# Patient Record
Sex: FEMALE | Race: WHITE | HISPANIC OR LATINO | Employment: STUDENT | URBAN - METROPOLITAN AREA
[De-identification: names, ages, dates, MRNs, and addresses within clinical notes are randomized per-mention and may not be internally consistent; named-entity substitution may affect disease eponyms.]

---
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
## 2019-07-30 ENCOUNTER — OFFICE VISIT (OUTPATIENT)
Dept: FAMILY MEDICINE CLINIC | Facility: CLINIC | Age: 19
End: 2019-07-30
Payer: COMMERCIAL

## 2019-07-30 VITALS
WEIGHT: 140 LBS | DIASTOLIC BLOOD PRESSURE: 78 MMHG | RESPIRATION RATE: 18 BRPM | HEIGHT: 59 IN | HEART RATE: 86 BPM | SYSTOLIC BLOOD PRESSURE: 106 MMHG | OXYGEN SATURATION: 99 % | BODY MASS INDEX: 28.22 KG/M2

## 2019-07-30 DIAGNOSIS — Z76.89 ENCOUNTER PRIOR TO INITIATION OF MEDICATION: Primary | ICD-10-CM

## 2019-07-30 PROCEDURE — 99213 OFFICE O/P EST LOW 20 MIN: CPT | Performed by: FAMILY MEDICINE

## 2019-07-30 PROCEDURE — 3008F BODY MASS INDEX DOCD: CPT | Performed by: FAMILY MEDICINE

## 2019-08-01 NOTE — PROGRESS NOTES
Assessment/Plan:    Diagnoses and all orders for this visit:    #1: Encounter prior to initiation of medication  Due to discontinuing medication and not following up, advised patient to seek help of Psychiatry for true diagnosis of Adult ADHD; Information for [employer], [address] , and [facility]; Patient voiced understanding and in agreement with plan        Subjective:      Patient ID: Vivian Barboza is a 20 y o  female  HPI    20year old female presents to clinic to discuss starting ADHD medication  Last seen in clinic 6 months ago  Started on Atamoxetine for ADHD  Trial period to help with concentration for 30 days  Patient did not follow up  States she did not like the medication  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  States teacher recommended Aderall  The following portions of the patient's history were reviewed and updated as appropriate: allergies, current medications, past family history, past medical history, past social history, past surgical history and problem list     Review of Systems   Psychiatric/Behavioral: Positive for decreased concentration  Negative for agitation, confusion, dysphoric mood, hallucinations, self-injury, sleep disturbance and suicidal ideas  The patient is nervous/anxious  The patient is not hyperactive  All other systems reviewed and are negative  Objective:    /78   Pulse 86   Resp 18   Ht 4' 11" (1 499 m)   Wt 63 5 kg (140 lb)   SpO2 99%   BMI 28 28 kg/m²      Physical Exam   Nursing note and vitals reviewed  **No Physical Exam Performed;  Discussion regarding starting ADHD Medication only**

## 2019-08-13 ENCOUNTER — HOSPITAL ENCOUNTER (EMERGENCY)
Facility: HOSPITAL | Age: 19
Discharge: HOME | End: 2019-08-13
Attending: EMERGENCY MEDICINE | Admitting: EMERGENCY MEDICINE
Payer: COMMERCIAL

## 2019-08-13 VITALS
SYSTOLIC BLOOD PRESSURE: 129 MMHG | HEART RATE: 86 BPM | DIASTOLIC BLOOD PRESSURE: 74 MMHG | OXYGEN SATURATION: 99 % | WEIGHT: 140 LBS | TEMPERATURE: 99.1 F | RESPIRATION RATE: 18 BRPM | BODY MASS INDEX: 28.28 KG/M2

## 2019-08-13 DIAGNOSIS — J02.9 PHARYNGITIS: Primary | ICD-10-CM

## 2019-08-13 PROCEDURE — 99282 EMERGENCY DEPT VISIT SF MDM: CPT

## 2019-08-13 RX ORDER — AMOXICILLIN 500 MG/1
500 CAPSULE ORAL 3 TIMES DAILY
Qty: 30 CAPSULE | Refills: 0 | Status: SHIPPED | OUTPATIENT
Start: 2019-08-13 | End: 2019-08-23

## 2019-08-13 RX ORDER — AMOXICILLIN 250 MG/1
500 CAPSULE ORAL ONCE
Status: COMPLETED | OUTPATIENT
Start: 2019-08-13 | End: 2019-08-13

## 2019-08-13 RX ADMIN — AMOXICILLIN 500 MG: 250 CAPSULE ORAL at 21:24

## 2019-08-14 NOTE — ED PROVIDER NOTES
History  Chief Complaint   Patient presents with    Sore Throat     Pt states sore throat and left ear pain for the last few days  21 yo female c/o right ear pain last week that she treated with peroxide drops and seemed to get better but the a couple days ago started with left ear and throat pain   + associated swelling and pain in left side of neck  No fever  No vomiting or diarrhea  No cough  Has not been swimming  History provided by:  Patient   used: No        Prior to Admission Medications   Prescriptions Last Dose Informant Patient Reported? Taking?   atomoxetine (STRATTERA) 40 mg capsule   No No   Sig: Take 1 capsule (40 mg total) by mouth daily for 3 doses Take tablet for first 3 days   atomoxetine (STRATTERA) 80 MG capsule   No Yes   Sig: Take 1 capsule (80 mg total) by mouth daily for 30 doses      Facility-Administered Medications: None       Past Medical History:   Diagnosis Date    ADHD        Past Surgical History:   Procedure Laterality Date    SINUS SURGERY         Family History   Problem Relation Age of Onset    No Known Problems Mother     No Known Problems Father      I have reviewed and agree with the history as documented  Social History     Tobacco Use    Smoking status: Never Smoker    Smokeless tobacco: Never Used   Substance Use Topics    Alcohol use: No    Drug use: No        Review of Systems   Constitutional: Negative  Negative for fever  HENT: Positive for ear pain and sore throat  Eyes: Negative  Respiratory: Negative  Negative for cough and shortness of breath  Cardiovascular: Negative  Negative for chest pain  Gastrointestinal: Negative  Negative for abdominal pain, diarrhea, nausea and vomiting  Genitourinary: Negative  Negative for dysuria and flank pain  Musculoskeletal: Negative  Negative for back pain and myalgias  Skin: Negative  Negative for rash and wound  Neurological: Negative    Negative for dizziness and headaches  Hematological: Does not bruise/bleed easily  Psychiatric/Behavioral: Negative  All other systems reviewed and are negative  Physical Exam  Physical Exam   Constitutional: She is oriented to person, place, and time  She appears well-developed and well-nourished  No distress  HENT:   Head: Normocephalic and atraumatic  Nose: Nose normal    Mouth/Throat: Uvula is midline  Posterior oropharyngeal edema and posterior oropharyngeal erythema present  No tonsillar abscesses  TMs not red but are dull, ? Fluid behind left TM,  No pain with pinna pull or tragus pressure   Eyes: Conjunctivae are normal    Neck: Normal range of motion  Neck supple  No tracheal deviation present  Cardiovascular: Normal rate, regular rhythm and normal heart sounds  No murmur heard  Pulmonary/Chest: Effort normal and breath sounds normal  No respiratory distress  Abdominal: Soft  Bowel sounds are normal  She exhibits no distension  There is no tenderness  Musculoskeletal: Normal range of motion  She exhibits no edema or deformity  Lymphadenopathy:     She has cervical adenopathy  Neurological: She is alert and oriented to person, place, and time  No cranial nerve deficit  She exhibits normal muscle tone  Skin: Skin is warm and dry  No rash noted  She is not diaphoretic  No pallor  Psychiatric: She has a normal mood and affect  Her behavior is normal    Nursing note and vitals reviewed        Vital Signs  ED Triage Vitals [08/13/19 2053]   Temperature Pulse Respirations Blood Pressure SpO2   99 1 °F (37 3 °C) 86 18 129/74 99 %      Temp Source Heart Rate Source Patient Position - Orthostatic VS BP Location FiO2 (%)   Tympanic Monitor Sitting Right arm --      Pain Score       8           Vitals:    08/13/19 2053   BP: 129/74   Pulse: 86   Patient Position - Orthostatic VS: Sitting         Visual Acuity      ED Medications  Medications   amoxicillin (AMOXIL) capsule 500 mg (has no administration in time range)       Diagnostic Studies  Results Reviewed     None                 No orders to display              Procedures  Procedures       ED Course                               MDM  Number of Diagnoses or Management Options  Diagnosis management comments: Advised abx, symptomatic tx with throat lozenges and/or salt water gargles, tylenol/advil prn  Disposition  Final diagnoses:   Pharyngitis     Time reflects when diagnosis was documented in both MDM as applicable and the Disposition within this note     Time User Action Codes Description Comment    4/09/1274  2:46 PM Caitie Needle Add [K93 7] Pharyngitis       ED Disposition     ED Disposition Condition Date/Time Comment    Discharge Stable Tue Aug 13, 2019  9:12 PM Nhi Navarrete discharge to home/self care  Follow-up Information     Follow up With Specialties Details Why Contact Info    Kassie Carnes MD Family Medicine Schedule an appointment as soon as possible for a visit  As needed [address] [place], [place] [id number]            Patient's Medications   Discharge Prescriptions    AMOXICILLIN (AMOXIL) 500 MG CAPSULE    Take 1 capsule (500 mg total) by mouth 3 (three) times a day for 10 days       Start Date: 8/13/2019 End Date: 8/23/2019       Order Dose: 500 mg       Quantity: 30 capsule    Refills: 0     No discharge procedures on file      ED Provider  Electronically Signed by           Kalin Emanuel MD  91/56/23 8799

## 2019-12-06 ENCOUNTER — OFFICE VISIT (OUTPATIENT)
Dept: URGENT CARE | Facility: CLINIC | Age: 19
End: 2019-12-06
Payer: COMMERCIAL

## 2019-12-06 VITALS
WEIGHT: 152 LBS | SYSTOLIC BLOOD PRESSURE: 117 MMHG | BODY MASS INDEX: 29.84 KG/M2 | RESPIRATION RATE: 16 BRPM | HEART RATE: 90 BPM | TEMPERATURE: 98.6 F | HEIGHT: 60 IN | OXYGEN SATURATION: 97 % | DIASTOLIC BLOOD PRESSURE: 71 MMHG

## 2019-12-06 DIAGNOSIS — R11.2 NON-INTRACTABLE VOMITING WITH NAUSEA, UNSPECIFIED VOMITING TYPE: ICD-10-CM

## 2019-12-06 DIAGNOSIS — R30.0 DYSURIA: Primary | ICD-10-CM

## 2019-12-06 LAB
SL AMB  POCT GLUCOSE, UA: ABNORMAL
SL AMB LEUKOCYTE ESTERASE,UA: ABNORMAL
SL AMB POCT BILIRUBIN,UA: ABNORMAL
SL AMB POCT BLOOD,UA: ABNORMAL
SL AMB POCT CLARITY,UA: ABNORMAL
SL AMB POCT COLOR,UA: ABNORMAL
SL AMB POCT KETONES,UA: ABNORMAL
SL AMB POCT NITRITE,UA: ABNORMAL
SL AMB POCT PH,UA: 7
SL AMB POCT SPECIFIC GRAVITY,UA: 1
SL AMB POCT URINE PROTEIN: ABNORMAL
SL AMB POCT UROBILINOGEN: ABNORMAL

## 2019-12-06 PROCEDURE — 81002 URINALYSIS NONAUTO W/O SCOPE: CPT | Performed by: PHYSICIAN ASSISTANT

## 2019-12-06 PROCEDURE — 87086 URINE CULTURE/COLONY COUNT: CPT | Performed by: PHYSICIAN ASSISTANT

## 2019-12-06 PROCEDURE — 99213 OFFICE O/P EST LOW 20 MIN: CPT | Performed by: PHYSICIAN ASSISTANT

## 2019-12-06 RX ORDER — NITROFURANTOIN 25; 75 MG/1; MG/1
100 CAPSULE ORAL 2 TIMES DAILY
Qty: 10 CAPSULE | Refills: 0 | Status: SHIPPED | OUTPATIENT
Start: 2019-12-06 | End: 2019-12-11

## 2019-12-06 NOTE — LETTER
December 6, 2019     Patient: Henrique Diego   YOB: 2000   Date of Visit: 12/6/2019       To Whom it May Concern:    Henrique Diego was seen in my clinic on 12/6/2019  She may return to school on 12/9/2019  If you have any questions or concerns, please don't hesitate to call           Sincerely,          Carlene Valero PA-C

## 2019-12-06 NOTE — PATIENT INSTRUCTIONS
Take the antibiotic as directed with food and water  Take a probiotic while taking this medication  Drink plenty of water  You may take a Azo, as directed for discomfort relief  Call in 48-72 hours for the result of your urine culture  Changes to your treatment plan will be made at this time if necessary  Follow BRAT (bananas, rice, apples, toast) diet as discussed  Once feeling up to it, you can begin to introduce new foods and advance diet as it is tolerated  Take Pepto-bismol or Tums as directed for nausea and stomach upset  Stay hydrated, drinking plenty of water  Supplement with Gatorade if vomiting recurs  Follow up with your family doctor in 3-5 days  Proceed to the ER if symptoms worsen

## 2019-12-06 NOTE — PROGRESS NOTES
[facility]        NAME: Fredy Carroll is a 20 y o  female  : 2000    MRN: [id number]  DATE: 2019  TIME: 6:04 PM    Assessment and Plan   Dysuria [R30 0]  1  Dysuria  POCT urine dip    Urine culture    nitrofurantoin (MACROBID) 100 mg capsule   2  Non-intractable vomiting with nausea, unspecified vomiting type       Patient Instructions   Take the antibiotic as directed with food and water  Take a probiotic while taking this medication  Drink plenty of water  You may take a Azo, as directed for discomfort relief  Call in 48-72 hours for the result of your urine culture  Changes to your treatment plan will be made at this time if necessary  Follow BRAT (bananas, rice, apples, toast) diet as discussed  Once feeling up to it, you can begin to introduce new foods and advance diet as it is tolerated  Take Pepto-bismol or Tums as directed for nausea and stomach upset  Stay hydrated, drinking plenty of water  Supplement with Gatorade if vomiting recurs  Follow up with your family doctor in 3-5 days  Proceed to the ER if symptoms worsen  Chief Complaint     Chief Complaint   Patient presents with    Vomiting     yesterday x 2    Possible UTI     feels pressure and has frequency     History of Present Illness   79-year-old female presenting vomiting x 1 days  Reports two episodes of vomiting and nausea yesterday  Nausea today upon awakening, now resolved with no subsequent vomiting  Reports frequency and urgency since yesterday, dysuria today  Lower abdominal pressure that does not radiate  Has felt fatigued and body aches today  No F/C/S, diarrhea, or decrease in urine output  No tx tried  Friend sick with GI illness  No other sick contacts or recent travel  Hx of UTI in childhood, otherwise negative  No hx of kidney infection or stone  Review of Systems   Review of Systems   Constitutional: Positive for fatigue  Negative for chills, diaphoresis and fever     Gastrointestinal: Positive for nausea and vomiting  Negative for abdominal pain and diarrhea  Genitourinary: Positive for dysuria, frequency and urgency  Negative for decreased urine volume  Musculoskeletal: Positive for myalgias  Skin: Negative for rash  Neurological: Negative for light-headedness and headaches  Current Medications       Current Outpatient Medications:     atomoxetine (STRATTERA) 80 MG capsule, Take 1 capsule (80 mg total) by mouth daily for 30 doses, Disp: 30 capsule, Rfl: 0    nitrofurantoin (MACROBID) 100 mg capsule, Take 1 capsule (100 mg total) by mouth 2 (two) times a day for 5 days, Disp: 10 capsule, Rfl: 0    Current Allergies     Allergies as of 12/06/2019    (No Known Allergies)            The following portions of the patient's history were reviewed and updated as appropriate: allergies, current medications, past family history, past medical history, past social history, past surgical history and problem list      Past Medical History:   Diagnosis Date    ADHD        Past Surgical History:   Procedure Laterality Date    SINUS SURGERY         Family History   Problem Relation Age of Onset    No Known Problems Mother     No Known Problems Father      Medications have been verified  Objective   /71   Pulse 90   Temp 98 6 °F (37 °C)   Resp 16   Ht 5' (1 524 m)   Wt 68 9 kg (152 lb)   LMP 11/24/2019   SpO2 97%   BMI 29 69 kg/m²      Urine Dip:   LEUKOCYTE ESTERASE,UA LARGE    NITRITE,UA NEG    SL AMB POCT UROBILINOGEN NEG    POCT URINE PROTEIN NEG     PH,UA 7    BLOOD,UA LARGE    SPECIFIC GRAVITY,UA 1 000    KETONES,UA NEG    BILIRUBIN,UA NEG    GLUCOSE, UA NEG     COLOR,UA STRAW    CLARITY,UA HAZY      Physical Exam     Physical Exam   Constitutional: She is oriented to person, place, and time  Vital signs are normal  She appears well-developed and well-nourished  She is cooperative  She does not appear ill  No distress  HENT:   Head: Normocephalic and atraumatic     Eyes: Conjunctivae and lids are normal  Right eye exhibits no discharge and no exudate  Left eye exhibits no discharge and no exudate  Cardiovascular: Normal rate, regular rhythm and normal heart sounds  Exam reveals no gallop, no distant heart sounds and no friction rub  No murmur heard  Pulmonary/Chest: Effort normal and breath sounds normal  No stridor  No respiratory distress  She has no decreased breath sounds  She has no wheezes  She has no rhonchi  She has no rales  Abdominal: Soft  Normal appearance and bowel sounds are normal  She exhibits no distension and no mass  There is tenderness in the suprapubic area  There is no rigidity, no rebound, no guarding and no CVA tenderness  Neurological: She is alert and oriented to person, place, and time  She is not disoriented  No cranial nerve deficit  She exhibits normal muscle tone  Coordination normal    Skin: Skin is warm, dry and intact  No rash noted  She is not diaphoretic  No erythema  No pallor  Psychiatric: She has a normal mood and affect  Her behavior is normal  Judgment and thought content normal    Nursing note and vitals reviewed

## 2019-12-07 LAB — BACTERIA UR CULT: NORMAL

## 2019-12-09 ENCOUNTER — TELEPHONE (OUTPATIENT)
Dept: URGENT CARE | Facility: CLINIC | Age: 19
End: 2019-12-09

## 2020-04-21 ENCOUNTER — TELEMEDICINE (OUTPATIENT)
Dept: FAMILY MEDICINE CLINIC | Facility: CLINIC | Age: 20
End: 2020-04-21
Payer: COMMERCIAL

## 2020-04-21 ENCOUNTER — DOCUMENTATION (OUTPATIENT)
Dept: URGENT CARE | Facility: CLINIC | Age: 20
End: 2020-04-21

## 2020-04-21 ENCOUNTER — TELEPHONE (OUTPATIENT)
Dept: FAMILY MEDICINE CLINIC | Facility: CLINIC | Age: 20
End: 2020-04-21

## 2020-04-21 DIAGNOSIS — Z20.828 EXPOSURE TO SARS-ASSOCIATED CORONAVIRUS: ICD-10-CM

## 2020-04-21 DIAGNOSIS — Z20.828 EXPOSURE TO SARS-ASSOCIATED CORONAVIRUS: Primary | ICD-10-CM

## 2020-04-21 PROCEDURE — 99213 OFFICE O/P EST LOW 20 MIN: CPT | Performed by: FAMILY MEDICINE

## 2020-04-21 PROCEDURE — 87635 SARS-COV-2 COVID-19 AMP PRB: CPT

## 2020-04-24 ENCOUNTER — TELEPHONE (OUTPATIENT)
Dept: FAMILY MEDICINE CLINIC | Facility: CLINIC | Age: 20
End: 2020-04-24

## 2020-04-24 DIAGNOSIS — Z20.828 EXPOSURE TO SARS-ASSOCIATED CORONAVIRUS: Primary | ICD-10-CM

## 2020-04-24 LAB — SARS-COV-2 RNA SPEC QL NAA+PROBE: ABNORMAL

## 2020-04-25 ENCOUNTER — DOCUMENTATION (OUTPATIENT)
Dept: URGENT CARE | Facility: CLINIC | Age: 20
End: 2020-04-25

## 2020-04-25 DIAGNOSIS — Z20.828 EXPOSURE TO SARS-ASSOCIATED CORONAVIRUS: ICD-10-CM

## 2020-04-25 PROCEDURE — U0003 INFECTIOUS AGENT DETECTION BY NUCLEIC ACID (DNA OR RNA); SEVERE ACUTE RESPIRATORY SYNDROME CORONAVIRUS 2 (SARS-COV-2) (CORONAVIRUS DISEASE [COVID-19]), AMPLIFIED PROBE TECHNIQUE, MAKING USE OF HIGH THROUGHPUT TECHNOLOGIES AS DESCRIBED BY CMS-2020-01-R: HCPCS

## 2020-04-27 ENCOUNTER — TELEPHONE (OUTPATIENT)
Dept: FAMILY MEDICINE CLINIC | Facility: CLINIC | Age: 20
End: 2020-04-27

## 2020-04-27 LAB — SARS-COV-2 RNA SPEC QL NAA+PROBE: DETECTED

## 2020-05-15 ENCOUNTER — PATIENT OUTREACH (OUTPATIENT)
Dept: FAMILY MEDICINE CLINIC | Facility: CLINIC | Age: 20
End: 2020-05-15

## 2020-05-26 ENCOUNTER — TELEPHONE (OUTPATIENT)
Dept: FAMILY MEDICINE CLINIC | Facility: CLINIC | Age: 20
End: 2020-05-26

## 2021-04-09 ENCOUNTER — OFFICE VISIT (OUTPATIENT)
Dept: URGENT CARE | Facility: CLINIC | Age: 21
End: 2021-04-09
Payer: COMMERCIAL

## 2021-04-09 VITALS
RESPIRATION RATE: 16 BRPM | HEIGHT: 59 IN | TEMPERATURE: 98.3 F | OXYGEN SATURATION: 98 % | HEART RATE: 72 BPM | DIASTOLIC BLOOD PRESSURE: 79 MMHG | BODY MASS INDEX: 34.27 KG/M2 | SYSTOLIC BLOOD PRESSURE: 115 MMHG | WEIGHT: 170 LBS

## 2021-04-09 DIAGNOSIS — R30.9 PAINFUL URINATION: Primary | ICD-10-CM

## 2021-04-09 DIAGNOSIS — N76.0 ACUTE VAGINITIS: ICD-10-CM

## 2021-04-09 LAB
SL AMB  POCT GLUCOSE, UA: ABNORMAL
SL AMB LEUKOCYTE ESTERASE,UA: ABNORMAL
SL AMB POCT BILIRUBIN,UA: ABNORMAL
SL AMB POCT BLOOD,UA: ABNORMAL
SL AMB POCT CLARITY,UA: ABNORMAL
SL AMB POCT COLOR,UA: YELLOW
SL AMB POCT KETONES,UA: ABNORMAL
SL AMB POCT NITRITE,UA: ABNORMAL
SL AMB POCT PH,UA: 6.5
SL AMB POCT SPECIFIC GRAVITY,UA: 1.02
SL AMB POCT URINE PROTEIN: ABNORMAL
SL AMB POCT UROBILINOGEN: 0.2

## 2021-04-09 PROCEDURE — 99213 OFFICE O/P EST LOW 20 MIN: CPT | Performed by: PHYSICIAN ASSISTANT

## 2021-04-09 PROCEDURE — 87086 URINE CULTURE/COLONY COUNT: CPT | Performed by: PHYSICIAN ASSISTANT

## 2021-04-09 PROCEDURE — 81002 URINALYSIS NONAUTO W/O SCOPE: CPT | Performed by: PHYSICIAN ASSISTANT

## 2021-04-09 PROCEDURE — 87147 CULTURE TYPE IMMUNOLOGIC: CPT | Performed by: PHYSICIAN ASSISTANT

## 2021-04-09 RX ORDER — NITROFURANTOIN 25; 75 MG/1; MG/1
100 CAPSULE ORAL 2 TIMES DAILY
Qty: 10 CAPSULE | Refills: 0 | Status: SHIPPED | OUTPATIENT
Start: 2021-04-09 | End: 2021-04-14

## 2021-04-09 NOTE — PATIENT INSTRUCTIONS
Take the antibiotic as directed with food and water  Take a probiotic while taking this medication  Drink plenty of water  Apply Vagisil cream for itch and irritation relief  Call in 48-72 hours for the result of your urine culture  Changes to your treatment plan will be made at this time if necessary  Follow up with your GYN or STD clinic in 3-5 days  Proceed to the ER if symptoms worsen

## 2021-04-09 NOTE — PROGRESS NOTES
[facility]        NAME: Tatyana Broderick is a 23 y o  female  : 2000    MRN: [id number]  DATE: 2021  TIME: 9:16 AM    Assessment and Plan   Painful urination [R30 9]  1  Painful urination  POCT urine dip    Urine culture    nitrofurantoin (MACROBID) 100 mg capsule   2  Acute vaginitis       Patient Instructions     Take the antibiotic as directed with food and water  Take a probiotic while taking this medication  Drink plenty of water  Apply Vagisil cream for itch and irritation relief  Call in 48-72 hours for the result of your urine culture  Changes to your treatment plan will be made at this time if necessary  Follow up with your GYN or STD clinic in 3-5 days  Proceed to the ER if symptoms worsen  Chief Complaint     Chief Complaint   Patient presents with    Possible UTI     UTI s/s with abdominal pain and painful urination started approx 3 days ago  History of Present Illness   22 y/o female with c/o dysuria x 3-4 days  States she had unprotected sex with a new partner on day of onset, then later that night noted some vaginal irritation and dysuria  The next day developed thick yellow discharge that is not odorous  Attempted to treat with a single dose Monistat yesterday, but reports this caused increased burning  Notes some suprapubic pressure with urination  Otherwise no F/C/S, abd pains, N/V, or flank pain  No vaginal bleeding  Denies redness or swelling of perineal area  Review of Systems   Review of Systems   Constitutional: Negative for chills, diaphoresis and fever  Gastrointestinal: Negative for abdominal pain, nausea and vomiting  Genitourinary: Positive for vaginal discharge and vaginal pain  Negative for decreased urine volume, dysuria, flank pain, frequency, genital sores, hematuria, urgency and vaginal bleeding  Neurological: Negative for headaches       Current Medications       Current Outpatient Medications:     atomoxetine (STRATTERA) 80 MG capsule, Take 1 capsule (80 mg total) by mouth daily for 30 doses, Disp: 30 capsule, Rfl: 0    nitrofurantoin (MACROBID) 100 mg capsule, Take 1 capsule (100 mg total) by mouth 2 (two) times a day for 5 days, Disp: 10 capsule, Rfl: 0    Current Allergies     Allergies as of 04/09/2021    (No Known Allergies)          The following portions of the patient's history were reviewed and updated as appropriate: allergies, current medications, past family history, past medical history, past social history, past surgical history and problem list      Past Medical History:   Diagnosis Date    ADHD        Past Surgical History:   Procedure Laterality Date    SINUS SURGERY         Family History   Problem Relation Age of Onset   Ray Gallardo Migraines Mother     No Known Problems Father     Migraines Maternal Grandmother     Diabetes type II Maternal Grandmother      Medications have been verified  Objective   /79   Pulse 72   Temp 98 3 °F (36 8 °C)   Resp 16   Ht 4' 11" (1 499 m)   Wt 77 1 kg (170 lb)   SpO2 98%   BMI 34 34 kg/m²   No LMP recorded  Urine Dip:  Component 4/9/21 8:51 AM   LEUKOCYTE ESTERASE,UA large    NITRITE,UA neg    SL AMB POCT UROBILINOGEN 0 2    POCT URINE PROTEIN trace     PH,UA 6 5    BLOOD,UA mod    SPECIFIC GRAVITY,UA 1 025    KETONES,UA neg    BILIRUBIN,UA neg    GLUCOSE, UA neg     COLOR,UA yellow    CLARITY,UA cloudy      Physical Exam     Physical Exam  Vitals signs and nursing note reviewed  Constitutional:       General: She is not in acute distress  Appearance: She is well-developed  She is not ill-appearing or diaphoretic  HENT:      Head: Normocephalic and atraumatic  Eyes:      General: Lids are normal       Conjunctiva/sclera: Conjunctivae normal    Cardiovascular:      Rate and Rhythm: Normal rate and regular rhythm  Pulmonary:      Effort: Pulmonary effort is normal  No respiratory distress  Breath sounds: Normal breath sounds   No decreased breath sounds, wheezing, rhonchi or rales  Abdominal:      General: Bowel sounds are normal  There is no distension  Palpations: Abdomen is soft  Abdomen is not rigid  There is no mass  Tenderness: There is abdominal tenderness in the suprapubic area  There is no right CVA tenderness, left CVA tenderness, guarding or rebound  Skin:     General: Skin is warm and dry  Neurological:      General: No focal deficit present  Mental Status: She is alert  She is not disoriented  Cranial Nerves: Cranial nerves are intact  Coordination: Coordination normal       Gait: Gait normal    Psychiatric:         Behavior: Behavior normal  Behavior is cooperative  Thought Content:  Thought content normal          Judgment: Judgment normal

## 2021-04-10 LAB — BACTERIA UR CULT: ABNORMAL

## 2023-12-06 ENCOUNTER — TELEPHONE (OUTPATIENT)
Dept: FAMILY MEDICINE CLINIC | Facility: CLINIC | Age: 23
End: 2023-12-06

## 2023-12-06 NOTE — TELEPHONE ENCOUNTER
Vm left on clinical line:    Hi my name is Ashley Thompson. I was just calling because I am trying to apply for this job and the job is requesting proof of immunizations and I was trying to see if I had a file there with all my immunizations and if I could, if I could schedule a physical and get a copy of the immunization. So if I could please get a call back at [phone]. I'd really appreciate it. Thank you so much and have a good day    Called patient and advised that we can print a copy of her immunization for pickup.

## 2024-02-06 ENCOUNTER — TELEPHONE (OUTPATIENT)
Dept: FAMILY MEDICINE CLINIC | Facility: CLINIC | Age: 24
End: 2024-02-06

## 2024-02-06 NOTE — TELEPHONE ENCOUNTER
----- Message from Klaudia Lombardo MD sent at 2/5/2024 12:31 PM EST -----  Hey!    Can we set up an appointment whenever with me for annual physical?    This patient has not been seen by us in over 3 years      Thank you!  Klaudia

## 2024-05-05 ENCOUNTER — HOSPITAL ENCOUNTER (EMERGENCY)
Facility: HOSPITAL | Age: 24
Discharge: HOME | End: 2024-05-05
Attending: STUDENT IN AN ORGANIZED HEALTH CARE EDUCATION/TRAINING PROGRAM

## 2024-05-05 ENCOUNTER — OFFICE VISIT (OUTPATIENT)
Dept: URGENT CARE | Facility: CLINIC | Age: 24
End: 2024-05-05

## 2024-05-05 VITALS
OXYGEN SATURATION: 96 % | SYSTOLIC BLOOD PRESSURE: 113 MMHG | HEART RATE: 92 BPM | DIASTOLIC BLOOD PRESSURE: 67 MMHG | RESPIRATION RATE: 16 BRPM | TEMPERATURE: 99.4 F

## 2024-05-05 VITALS
OXYGEN SATURATION: 96 % | BODY MASS INDEX: 37.16 KG/M2 | WEIGHT: 184 LBS | TEMPERATURE: 98.2 F | RESPIRATION RATE: 12 BRPM | HEART RATE: 81 BPM

## 2024-05-05 DIAGNOSIS — S61.309A NAIL AVULSION, FINGER, INITIAL ENCOUNTER: Primary | ICD-10-CM

## 2024-05-05 PROCEDURE — 99282 EMERGENCY DEPT VISIT SF MDM: CPT

## 2024-05-05 PROCEDURE — 99284 EMERGENCY DEPT VISIT MOD MDM: CPT | Performed by: STUDENT IN AN ORGANIZED HEALTH CARE EDUCATION/TRAINING PROGRAM

## 2024-05-05 RX ORDER — INSULIN GLARGINE 100 [IU]/ML
INJECTION, SOLUTION SUBCUTANEOUS
COMMUNITY

## 2024-05-05 RX ORDER — LIDOCAINE HYDROCHLORIDE 10 MG/ML
10 INJECTION, SOLUTION EPIDURAL; INFILTRATION; INTRACAUDAL; PERINEURAL ONCE
Status: COMPLETED | OUTPATIENT
Start: 2024-05-05 | End: 2024-05-05

## 2024-05-05 RX ADMIN — LIDOCAINE HYDROCHLORIDE 10 ML: 10 INJECTION, SOLUTION EPIDURAL; INFILTRATION; INTRACAUDAL at 14:32

## 2024-05-05 NOTE — DISCHARGE INSTRUCTIONS
You were seen in the emergency department for an injury to your nail.  This was repaired.  Please follow-up with your family doctor as needed.  Return to the emergency department for any worsening pain, fevers, redness, or for any other new or concerning symptoms.

## 2024-05-05 NOTE — PROGRESS NOTES
[facility]        NAME: Alize Means is a 23 y.o. female  : 2000    MRN: [id number]  DATE: May 5, 2024  TIME: 1:01 PM    Assessment and Plan   Nail avulsion, finger, initial encounter [S61.309A]  1. Nail avulsion, finger, initial encounter  Transfer to other facility            Patient Instructions   L 5th nail avulsion: We discussed that the long acrylic nail complicates the avulsion. We discussed that as this is not something I see typically in this setting she would be better suited to follow up in the ED for possible complete nail removal and to better assess for laceration of the nailbed or options to save the nail. She is agreeable and will go to the ED now.     Follow up with PCP in 3-5 days.  Proceed to  ER if symptoms worsen.    If tests have been performed at [facility], our office will contact you with results if changes need to be made to the care plan discussed with you at the visit.  You can review your full results on [facility] MyChart.    Chief Complaint     Chief Complaint   Patient presents with    Nail Problem     PT PRESENTS WITH LEFT HAND 5TH DIGIT NAIL INJURY R/T nail lifting off nailbed this morning         History of Present Illness       The patient presents today for L sided 5th digit nail injury. She had her nails done yesterday. She was adjusting her mattress this morning and the nail got caught. She states that she has long acrylic nails that got caught and pulled the digit.  She states that the natural nail is lifting off of the bed. She has a throbbing pain. She has no edema, erythema or ecchymosis of the digit. No weakness, numbness or tingling. No OTC measures. She has full ROM of the digit.               Review of Systems   Review of Systems   Constitutional:  Negative for activity change, appetite change, chills, fatigue and fever.   Musculoskeletal:  Negative for arthralgias, back pain, gait problem, joint swelling, myalgias, neck pain and neck stiffness.   Skin:   Positive for color change and wound. Negative for pallor and rash.   Allergic/Immunologic: Negative for immunocompromised state.   Neurological:  Negative for dizziness, weakness, light-headedness and numbness.   Hematological:  Does not bruise/bleed easily.         Current Medications       Current Outpatient Medications:     insulin glargine (LANTUS) 100 units/mL subcutaneous injection, Inject under the skin, Disp: , Rfl:     atomoxetine (STRATTERA) 80 MG capsule, Take 1 capsule (80 mg total) by mouth daily for 30 doses, Disp: 30 capsule, Rfl: 0    Current Allergies     Allergies as of 05/05/2024    (No Known Allergies)            The following portions of the patient's history were reviewed and updated as appropriate: allergies, current medications, past family history, past medical history, past social history, past surgical history and problem list.     Past Medical History:   Diagnosis Date    ADHD        Past Surgical History:   Procedure Laterality Date    SINUS SURGERY         Family History   Problem Relation Age of Onset    Migraines Mother     No Known Problems Father     Migraines Maternal Grandmother     Diabetes type II Maternal Grandmother          Medications have been verified.        Objective   Pulse 81   Temp 98.2 °F (36.8 °C)   Resp 12   Wt 83.5 kg (184 lb)   LMP 05/05/2024 (Exact Date)   SpO2 96%   BMI 37.16 kg/m²   Patient's last menstrual period was 05/05/2024 (exact date).       Physical Exam     Physical Exam  Vitals and nursing note reviewed.   Constitutional:       General: She is not in acute distress.     Appearance: She is well-developed. She is not diaphoretic.   Cardiovascular:      Rate and Rhythm: Normal rate and regular rhythm.      Heart sounds: Normal heart sounds.   Pulmonary:      Effort: Pulmonary effort is normal.      Breath sounds: Normal breath sounds.   Musculoskeletal:      Comments: Left 5th digit: There is an acrylic nail overlying the natural nail which is ~1  inch in length. There is oozing of serosanguineous drainage and blood around the entire nail which is lifting off of the bed. The matrix and base of the nail is intact. Sensation of the digit is intact. She has full ROM. She has mild erythema over the distal aspect.    Skin:     General: Skin is warm and dry.      Findings: No bruising, ecchymosis or erythema.   Neurological:      Sensory: Sensation is intact. No sensory deficit.      Motor: No weakness or abnormal muscle tone.      Gait: Gait normal.   Psychiatric:         Behavior: Behavior normal.

## 2024-05-05 NOTE — ED PROVIDER NOTES
History  Chief Complaint   Patient presents with    Nail Bed Injury     Sent from [facility] for nail avulsion left 5th finger. Extremely long acrylic nails done yesterday. Went to lift mattress      Pt is a 22 YO F, PMHx including ADHD, who presents to the emergency department for left fifth digit nail injury.  Patient had acrylic nails placed recently.  While moving her mattress she accidentally got the finger nail caught, causing it to bend backwards.  She initially presented to urgent care for this but she was referred to the emergency department for further evaluation.  Patient denies any other injuries.  No other complaints or concerns.        Prior to Admission Medications   Prescriptions Last Dose Informant Patient Reported? Taking?   atomoxetine (STRATTERA) 80 MG capsule   No No   Sig: Take 1 capsule (80 mg total) by mouth daily for 30 doses   insulin glargine (LANTUS) 100 units/mL subcutaneous injection  Self Yes No   Sig: Inject under the skin      Facility-Administered Medications: None       Past Medical History:   Diagnosis Date    ADHD        Past Surgical History:   Procedure Laterality Date    SINUS SURGERY         Family History   Problem Relation Age of Onset    Migraines Mother     No Known Problems Father     Migraines Maternal Grandmother     Diabetes type II Maternal Grandmother      I have reviewed and agree with the history as documented.    E-Cigarette/Vaping    E-Cigarette Use Current Some Day User      E-Cigarette/Vaping Substances    Nicotine Yes     THC Yes      Social History     Tobacco Use    Smoking status: Never     Passive exposure: Never    Smokeless tobacco: Never    Tobacco comments:     Former smoker - As per [facility]    Vaping Use    Vaping status: Some Days    Substances: Nicotine, THC   Substance Use Topics    Alcohol use: Yes    Drug use: Yes     Types: Marijuana       Review of Systems   Musculoskeletal:         Left nail injury   All other systems reviewed and are  negative.      Physical Exam  Physical Exam  Vitals and nursing note reviewed.   Constitutional:       General: She is not in acute distress.     Appearance: She is well-developed. She is not ill-appearing, toxic-appearing or diaphoretic.   HENT:      Head: Normocephalic and atraumatic.      Right Ear: External ear normal.      Left Ear: External ear normal.      Nose: Nose normal.   Eyes:      General: Lids are normal. No scleral icterus.  Cardiovascular:      Rate and Rhythm: Normal rate and regular rhythm.   Pulmonary:      Effort: Pulmonary effort is normal. No respiratory distress.   Musculoskeletal:         General: No deformity. Normal range of motion.        Hands:       Cervical back: Normal range of motion and neck supple.   Skin:     General: Skin is warm and dry.   Neurological:      General: No focal deficit present.      Mental Status: She is alert.   Psychiatric:         Mood and Affect: Mood normal.         Behavior: Behavior normal.         Vital Signs  ED Triage Vitals [05/05/24 1416]   Temperature Pulse Respirations Blood Pressure SpO2   99.4 °F (37.4 °C) 92 16 113/67 96 %      Temp Source Heart Rate Source Patient Position - Orthostatic VS BP Location FiO2 (%)   Tympanic Monitor Sitting Right arm --      Pain Score       10 - Worst Possible Pain           Vitals:    05/05/24 1416   BP: 113/67   Pulse: 92   Patient Position - Orthostatic VS: Sitting         Visual Acuity      ED Medications  Medications   lidocaine (PF) (XYLOCAINE-MPF) 1 % injection 10 mL (10 mL Infiltration Given 5/5/24 1432)       Diagnostic Studies  Results Reviewed       None                   No orders to display              Procedures  Procedures         ED Course                               SBIRT 22yo+      Flowsheet Row Most Recent Value   Initial Alcohol Screen: US AUDIT-C     1. How often do you have a drink containing alcohol? 1 Filed at: 05/05/2024 2562   2. How many drinks containing alcohol do you have on a  "typical day you are drinking?  1 Filed at: 05/05/2024 1418   3b. FEMALE Any Age, or MALE 65+: How often do you have 4 or more drinks on one occassion? 0 Filed at: 05/05/2024 1418   Audit-C Score 2 Filed at: 05/05/2024 1418   CELESTINO: How many times in the past year have you...    Used an illegal drug or used a prescription medication for non-medical reasons? Never Filed at: 05/05/2024 1418                      Medical Decision Making  Patient is a 23 y.o. female who presents to the ED for partial nail avulsion.  Patient is nontoxic and well-appearing.  Vitals are stable.    Differential includes but is not limited to: Nail avulsion.  Presentation not consistent with fracture, dislocation.    Plan: Reassurance given majority of the nail remains attached to the nailbed including the proximal portion/nail matrix, d/c.                Portions of the record may have been created with voice recognition software. Occasional wrong word or \"sound a like\" substitutions may have occurred due to the inherent limitations of voice recognition software. Read the chart carefully and recognize, using context, where substitutions have occurred.    Problems Addressed:  Nail avulsion, finger, initial encounter: acute illness or injury    Risk  Prescription drug management.             Disposition  Final diagnoses:   Nail avulsion, finger, initial encounter     Time reflects when diagnosis was documented in both MDM as applicable and the Disposition within this note       Time User Action Codes Description Comment    5/5/2024  3:10 PM Phlil Phillips Add [S61.309A] Nail avulsion, finger, initial encounter           ED Disposition       ED Disposition   Discharge    Condition   Stable    Date/Time   Sun May 5, 2024 8620    Comment   Alize Means discharge to home/self care.                   Follow-up Information       Follow up With Specialties Details Why Contact Info Additional Information    [employer]  Call in 1 day  [phone]       " [facility] Emergency Department Emergency Medicine   [address] [address]  [phone] [facility] Emergency Department, [address], [place], [address]            Discharge Medication List as of 5/5/2024  3:11 PM        CONTINUE these medications which have NOT CHANGED    Details   atomoxetine (STRATTERA) 80 MG capsule Take 1 capsule (80 mg total) by mouth daily for 30 doses, Starting Sun 11/18/2018, Until Tue 8/13/2019, Normal      insulin glargine (LANTUS) 100 units/mL subcutaneous injection Inject under the skin, Historical Med             No discharge procedures on file.    PDMP Review       None            ED Provider  Electronically Signed by             Phill Phillips DO  05/05/24 8269

## 2024-05-06 ENCOUNTER — TELEPHONE (OUTPATIENT)
Age: 24
End: 2024-05-06

## 2024-05-06 NOTE — TELEPHONE ENCOUNTER
MD Christiane Mackenzie; Patricia Cole!    She was seen in the ED, can we set up an appointment with me?      Thank you!  Klaudia

## 2025-04-28 DIAGNOSIS — Z00.6 ENCOUNTER FOR EXAMINATION FOR NORMAL COMPARISON OR CONTROL IN CLINICAL RESEARCH PROGRAM: ICD-10-CM
